# Patient Record
Sex: FEMALE | Race: WHITE | Employment: FULL TIME | URBAN - METROPOLITAN AREA
[De-identification: names, ages, dates, MRNs, and addresses within clinical notes are randomized per-mention and may not be internally consistent; named-entity substitution may affect disease eponyms.]

---

## 2017-01-06 ENCOUNTER — APPOINTMENT (OUTPATIENT)
Dept: LAB | Facility: HOSPITAL | Age: 32
End: 2017-01-06
Attending: OBSTETRICS & GYNECOLOGY
Payer: COMMERCIAL

## 2017-01-06 ENCOUNTER — OFFICE VISIT (OUTPATIENT)
Dept: OBGYN CLINIC | Facility: CLINIC | Age: 32
End: 2017-01-06

## 2017-01-06 VITALS
HEART RATE: 88 BPM | DIASTOLIC BLOOD PRESSURE: 82 MMHG | SYSTOLIC BLOOD PRESSURE: 145 MMHG | WEIGHT: 170 LBS | HEIGHT: 65 IN | BODY MASS INDEX: 28.32 KG/M2

## 2017-01-06 DIAGNOSIS — Z12.4 SCREENING FOR MALIGNANT NEOPLASM OF CERVIX: ICD-10-CM

## 2017-01-06 DIAGNOSIS — N92.0 MENORRHAGIA WITH REGULAR CYCLE: ICD-10-CM

## 2017-01-06 DIAGNOSIS — Z01.419 ENCOUNTER FOR GYNECOLOGICAL EXAMINATION: Primary | ICD-10-CM

## 2017-01-06 LAB — TSH SERPL-ACNC: 2.58 UIU/ML (ref 0.34–5.6)

## 2017-01-06 PROCEDURE — 84443 ASSAY THYROID STIM HORMONE: CPT

## 2017-01-06 PROCEDURE — 36415 COLL VENOUS BLD VENIPUNCTURE: CPT

## 2017-01-06 PROCEDURE — 99395 PREV VISIT EST AGE 18-39: CPT | Performed by: OBSTETRICS & GYNECOLOGY

## 2017-01-06 RX ORDER — NORGESTIMATE AND ETHINYL ESTRADIOL 0.25-0.035
1 KIT ORAL DAILY
Qty: 1 PACKAGE | Refills: 12 | Status: SHIPPED | OUTPATIENT
Start: 2017-01-06 | End: 2017-12-26

## 2017-01-06 RX ORDER — NORGESTIMATE AND ETHINYL ESTRADIOL 7DAYSX3 LO
KIT ORAL
Refills: 4 | COMMUNITY
Start: 2016-12-30 | End: 2018-01-12

## 2017-01-09 NOTE — PROGRESS NOTES
Yumiko Amor is a 36year old female [facility] Patient's last menstrual period was 12/26/2016. here for annual exam.       Last seen 12/21/15. Last pap 12/2013. Currently on Ortho Tri Cyclen Lo but does not like due to heavier periods.   Did not like Mo anxiety. Endocrine:   denies excessive thirst or urination. Heme/Lymph:  easy bruising or bleeding.     PHYSICAL EXAM:   /82 mmHg  Pulse 88  Ht 5' 5\" (1.651 m)  Wt 170 lb (77.111 kg)  BMI 28.29 kg/m2  LMP 12/26/2016  Wt Readings from Last 2 Encount

## 2017-01-26 RX ORDER — NORGESTIMATE AND ETHINYL ESTRADIOL 7DAYSX3 LO
KIT ORAL
Qty: 28 TABLET | Refills: 0 | OUTPATIENT
Start: 2017-01-26

## 2017-01-26 NOTE — TELEPHONE ENCOUNTER
SENT BACK RX DENIED, REQUESTED TOO SOON AND NOTED THAT RX WAS ALREADY SENT TO PHARMACY ON 1-6-17 FOR 1 PACK WITH 12 REFILLS.

## 2017-01-31 ENCOUNTER — LAB SERVICES (OUTPATIENT)
Dept: OTHER | Age: 32
End: 2017-01-31

## 2017-01-31 ENCOUNTER — CHARTING TRANS (OUTPATIENT)
Dept: OTHER | Age: 32
End: 2017-01-31

## 2017-01-31 LAB
APPEARANCE: CLEAR
BILIRUBIN: NORMAL
COLOR: YELLOW
GLUCOSE U: NORMAL
KETONES: NORMAL
LEUKOCYTES: 1
NITRITE: NORMAL
OCCULT BLOOD: NORMAL
OTHER: NORMAL
PH: 6
PROTEIN: NORMAL
URINE SPEC GRAVITY: 1
UROBILINOGEN: 0.2

## 2017-12-26 RX ORDER — NORGESTIMATE AND ETHINYL ESTRADIOL 0.25-0.035
1 KIT ORAL DAILY
Qty: 28 TABLET | Refills: 0 | Status: SHIPPED | OUTPATIENT
Start: 2017-12-26 | End: 2018-01-12

## 2018-01-12 ENCOUNTER — OFFICE VISIT (OUTPATIENT)
Dept: OBGYN CLINIC | Facility: CLINIC | Age: 33
End: 2018-01-12

## 2018-01-12 VITALS
HEART RATE: 76 BPM | WEIGHT: 162 LBS | BODY MASS INDEX: 26.99 KG/M2 | SYSTOLIC BLOOD PRESSURE: 135 MMHG | DIASTOLIC BLOOD PRESSURE: 78 MMHG | HEIGHT: 65 IN

## 2018-01-12 DIAGNOSIS — Z01.419 ENCOUNTER FOR GYNECOLOGICAL EXAMINATION: Primary | ICD-10-CM

## 2018-01-12 PROCEDURE — 99395 PREV VISIT EST AGE 18-39: CPT | Performed by: OBSTETRICS & GYNECOLOGY

## 2018-01-12 RX ORDER — NORGESTIMATE AND ETHINYL ESTRADIOL 0.25-0.035
1 KIT ORAL DAILY
Qty: 28 TABLET | Refills: 12 | Status: SHIPPED | OUTPATIENT
Start: 2018-01-12 | End: 2018-10-05

## 2018-01-12 NOTE — PROGRESS NOTES
Bronwyn Ivey is a 29year old female [facility] Patient's last menstrual period was 12/27/2017. here for annual exam.       Last seen 1/6/17. Last pap 1/2017 normal.    Doing well with [facility] and wants to continue.    and not planning to have ch 135/78   Pulse 76   Ht 5' 5\" (1.651 m)   Wt 162 lb (73.5 kg)   LMP 12/27/2017   BMI 26.96 kg/m²   Wt Readings from Last 2 Encounters:  01/12/18 : 162 lb (73.5 kg)  01/06/17 : 170 lb (77.1 kg)    Body mass index is 26.96 kg/m².     Constitutional: well deve

## 2018-01-14 LAB — HPV I/H RISK 1 DNA SPEC QL NAA+PROBE: NEGATIVE

## 2018-10-03 ENCOUNTER — PATIENT MESSAGE (OUTPATIENT)
Dept: OBGYN CLINIC | Facility: CLINIC | Age: 33
End: 2018-10-03

## 2018-10-04 NOTE — TELEPHONE ENCOUNTER
From: Nora Larson  To: Kofi Johnson MD  Sent: 10/3/2018 8:57 PM CDT  Subject: Prescription Question    Interested in switching to Lo Loestrin Fe birth control.

## 2018-11-05 VITALS
WEIGHT: 155 LBS | TEMPERATURE: 97.9 F | RESPIRATION RATE: 16 BRPM | HEART RATE: 88 BPM | SYSTOLIC BLOOD PRESSURE: 110 MMHG | HEIGHT: 64 IN | BODY MASS INDEX: 26.46 KG/M2 | DIASTOLIC BLOOD PRESSURE: 80 MMHG

## 2019-01-18 ENCOUNTER — OFFICE VISIT (OUTPATIENT)
Dept: OBGYN CLINIC | Facility: CLINIC | Age: 34
End: 2019-01-18
Payer: COMMERCIAL

## 2019-01-18 VITALS
HEIGHT: 65 IN | HEART RATE: 97 BPM | BODY MASS INDEX: 28.22 KG/M2 | WEIGHT: 169.38 LBS | SYSTOLIC BLOOD PRESSURE: 134 MMHG | DIASTOLIC BLOOD PRESSURE: 87 MMHG

## 2019-01-18 DIAGNOSIS — Z01.419 ENCOUNTER FOR GYNECOLOGICAL EXAMINATION: Primary | ICD-10-CM

## 2019-01-18 PROCEDURE — 99395 PREV VISIT EST AGE 18-39: CPT | Performed by: OBSTETRICS & GYNECOLOGY

## 2019-01-18 RX ORDER — NORETHINDRONE ACETATE AND ETHINYL ESTRADIOL, ETHINYL ESTRADIOL AND FERROUS FUMARATE 1MG-10(24)
KIT ORAL
Qty: 1 PACKAGE | Refills: 12 | Status: SHIPPED | OUTPATIENT
Start: 2019-01-18 | End: 2021-08-19 | Stop reason: ALTCHOICE

## 2019-01-18 RX ORDER — NORETHINDRONE ACETATE AND ETHINYL ESTRADIOL, ETHINYL ESTRADIOL AND FERROUS FUMARATE 1MG-10(24)
KIT ORAL
Refills: 2 | COMMUNITY
Start: 2018-12-18 | End: 2019-01-18

## 2019-01-19 RX ORDER — NORETHINDRONE ACETATE AND ETHINYL ESTRADIOL, ETHINYL ESTRADIOL AND FERROUS FUMARATE 1MG-10(24)
KIT ORAL
Qty: 28 TABLET | Refills: 0 | OUTPATIENT
Start: 2019-01-19

## 2019-01-19 NOTE — TELEPHONE ENCOUNTER
Pt was seen by Kevin Alexandra on 1/18 and was given an rx for lo loestrin fe #1 with 12 refills. Refills denied.

## 2019-01-19 NOTE — PROGRESS NOTES
Ryan Naqvi is a 34year old female [facility] Patient's last menstrual period was 01/01/2019. here for annual exam.       Last seen 1/12/18. Last pap 1/2018 normal with neg HPV. Changed to Meghan PATEL in 10/2018.   States she is much happier with thirst or urination. Heme/Lymph:  easy bruising or bleeding.     PHYSICAL EXAM:   /87   Pulse 97   Ht 5' 5\" (1.651 m)   Wt 169 lb 6.4 oz (76.8 kg)   LMP 01/01/2019   BMI 28.19 kg/m²   Wt Readings from Last 2 Encounters:  01/18/19 : 169 lb 6.4 oz (76

## 2019-06-20 NOTE — PATIENT INSTRUCTIONS
Jany Bhatia is a 50year old female. Patient presents with:  Back Pain: Patient is here today with lower right sided back pain for the past 3 weeks. She injured it while exercising-- a certain movement caused the issues.  Pain is not constant-- occurs wit Pap and HPV.   Refill Mononessa Interested in starting something other than Effexor. Doing yoga which helps.          Current Outpatient Medications:  Albuterol Sulfate HFA (VENTOLIN HFA) 108 (90 Base) MCG/ACT Inhalation Aero Soln Inhale 2 puffs into the lungs every 4 (four) hours as need Encounters:  06/20/19 : 207 lb (93.9 kg)  04/30/19 : 208 lb (94.3 kg)  04/05/19 : 208 lb (94.3 kg)  03/19/19 : 208 lb (94.3 kg)  09/12/18 : 203 lb 9.6 oz (92.4 kg)    Body mass index is 33.41 kg/m².       EXAM:   /70 (BP Location: Right arm, Patient P

## 2021-08-19 ENCOUNTER — OFFICE VISIT (OUTPATIENT)
Dept: FAMILY MEDICINE CLINIC | Facility: CLINIC | Age: 36
End: 2021-08-19
Payer: COMMERCIAL

## 2021-08-19 VITALS
HEART RATE: 96 BPM | HEIGHT: 65 IN | WEIGHT: 150 LBS | BODY MASS INDEX: 24.99 KG/M2 | DIASTOLIC BLOOD PRESSURE: 76 MMHG | SYSTOLIC BLOOD PRESSURE: 122 MMHG

## 2021-08-19 DIAGNOSIS — Z00.00 ROUTINE GENERAL MEDICAL EXAMINATION AT A HEALTH CARE FACILITY: Primary | ICD-10-CM

## 2021-08-19 DIAGNOSIS — E55.9 VITAMIN D DEFICIENCY: ICD-10-CM

## 2021-08-19 PROCEDURE — 3074F SYST BP LT 130 MM HG: CPT | Performed by: PHYSICIAN ASSISTANT

## 2021-08-19 PROCEDURE — 3008F BODY MASS INDEX DOCD: CPT | Performed by: PHYSICIAN ASSISTANT

## 2021-08-19 PROCEDURE — 3078F DIAST BP <80 MM HG: CPT | Performed by: PHYSICIAN ASSISTANT

## 2021-08-19 PROCEDURE — 99395 PREV VISIT EST AGE 18-39: CPT | Performed by: PHYSICIAN ASSISTANT

## 2021-08-19 NOTE — PROGRESS NOTES
HPI:   Yaquelin Griffin is a 31year old female who presents for an Annual Health Visit. Patient is doing fine at this time. Patient denies of chest pain, SOB, N/V/C/D, fever, dizziness, syncope, abdominal pain. There are no other concerns today. ear canal and external ear normal. There is no impacted cerumen. Left Ear: Tympanic membrane, ear canal and external ear normal. There is no impacted cerumen.       Nose: Nose normal.      Mouth/Throat:      Mouth: Mucous membranes are moist.      Phar 18 to 39  Screening tests and vaccines are an important part of managing your health. A screening test is done to find possible disorders or diseases in people who don't have any symptoms.  The goal is to find a disease early so lifestyle changes can be mad exams   HIV All women At routine exams3     Obesity All women in this age group  At routine exams   Syphilis Women at increased risk for infection should talk with their healthcare provider  At routine exams   Tuberculosis Women at increased risk for infec pneumococcal bacteria)   PPSV23: 1 to 2 doses through age 59, or 1 dose at 72 or older (protects against 23 types of pneumococcal bacteria)    Tetanus/diphtheria/pertussis (Td/Tdap) booster All women in this age group  Td every 10 years, or a one-time dose for professional medical care. Always follow your healthcare professional's instructions. The patient indicates understanding of these issues and agrees to the plan. Problem List:  There is no problem list on file for this patient.       Minhx

## 2024-05-18 ENCOUNTER — LAB ENCOUNTER (OUTPATIENT)
Dept: LAB | Age: 39
End: 2024-05-18
Attending: PHYSICIAN ASSISTANT

## 2024-05-18 ENCOUNTER — OFFICE VISIT (OUTPATIENT)
Dept: FAMILY MEDICINE CLINIC | Facility: CLINIC | Age: 39
End: 2024-05-18

## 2024-05-18 VITALS
SYSTOLIC BLOOD PRESSURE: 129 MMHG | DIASTOLIC BLOOD PRESSURE: 86 MMHG | WEIGHT: 143 LBS | HEART RATE: 66 BPM | BODY MASS INDEX: 23.82 KG/M2 | HEIGHT: 65 IN

## 2024-05-18 DIAGNOSIS — Z00.00 ROUTINE GENERAL MEDICAL EXAMINATION AT A HEALTH CARE FACILITY: ICD-10-CM

## 2024-05-18 DIAGNOSIS — Z00.00 ROUTINE GENERAL MEDICAL EXAMINATION AT A HEALTH CARE FACILITY: Primary | ICD-10-CM

## 2024-05-18 DIAGNOSIS — Z01.419 ENCOUNTER FOR ROUTINE GYNECOLOGICAL EXAMINATION WITH PAPANICOLAOU SMEAR OF CERVIX: ICD-10-CM

## 2024-05-18 DIAGNOSIS — E55.9 VITAMIN D DEFICIENCY: ICD-10-CM

## 2024-05-18 LAB
ALBUMIN SERPL-MCNC: 4.6 G/DL (ref 3.2–4.8)
ALBUMIN/GLOB SERPL: 1.7 {RATIO} (ref 1–2)
ALP LIVER SERPL-CCNC: 39 U/L
ALT SERPL-CCNC: 21 U/L
ANION GAP SERPL CALC-SCNC: 7 MMOL/L (ref 0–18)
AST SERPL-CCNC: 18 U/L (ref ?–34)
BASOPHILS # BLD AUTO: 0.04 X10(3) UL (ref 0–0.2)
BASOPHILS NFR BLD AUTO: 1 %
BILIRUB SERPL-MCNC: 0.5 MG/DL (ref 0.3–1.2)
BUN BLD-MCNC: 23 MG/DL (ref 9–23)
BUN/CREAT SERPL: 25.3 (ref 10–20)
CALCIUM BLD-MCNC: 9.7 MG/DL (ref 8.7–10.4)
CHLORIDE SERPL-SCNC: 111 MMOL/L (ref 98–112)
CHOLEST SERPL-MCNC: 217 MG/DL (ref ?–200)
CO2 SERPL-SCNC: 27 MMOL/L (ref 21–32)
CREAT BLD-MCNC: 0.91 MG/DL
DEPRECATED RDW RBC AUTO: 38.9 FL (ref 35.1–46.3)
EGFRCR SERPLBLD CKD-EPI 2021: 84 ML/MIN/1.73M2 (ref 60–?)
EOSINOPHIL # BLD AUTO: 0.09 X10(3) UL (ref 0–0.7)
EOSINOPHIL NFR BLD AUTO: 2.3 %
ERYTHROCYTE [DISTWIDTH] IN BLOOD BY AUTOMATED COUNT: 12.6 % (ref 11–15)
FASTING PATIENT LIPID ANSWER: YES
FASTING STATUS PATIENT QL REPORTED: YES
GLOBULIN PLAS-MCNC: 2.7 G/DL (ref 2–3.5)
GLUCOSE BLD-MCNC: 89 MG/DL (ref 70–99)
HCT VFR BLD AUTO: 39.5 %
HDLC SERPL-MCNC: 90 MG/DL (ref 40–59)
HGB BLD-MCNC: 13.6 G/DL
IMM GRANULOCYTES # BLD AUTO: 0.01 X10(3) UL (ref 0–1)
IMM GRANULOCYTES NFR BLD: 0.3 %
LDLC SERPL CALC-MCNC: 120 MG/DL (ref ?–100)
LYMPHOCYTES # BLD AUTO: 1.31 X10(3) UL (ref 1–4)
LYMPHOCYTES NFR BLD AUTO: 33.6 %
MCH RBC QN AUTO: 29.3 PG (ref 26–34)
MCHC RBC AUTO-ENTMCNC: 34.4 G/DL (ref 31–37)
MCV RBC AUTO: 85.1 FL
MONOCYTES # BLD AUTO: 0.29 X10(3) UL (ref 0.1–1)
MONOCYTES NFR BLD AUTO: 7.4 %
NEUTROPHILS # BLD AUTO: 2.16 X10 (3) UL (ref 1.5–7.7)
NEUTROPHILS # BLD AUTO: 2.16 X10(3) UL (ref 1.5–7.7)
NEUTROPHILS NFR BLD AUTO: 55.4 %
NONHDLC SERPL-MCNC: 127 MG/DL (ref ?–130)
OSMOLALITY SERPL CALC.SUM OF ELEC: 303 MOSM/KG (ref 275–295)
PLATELET # BLD AUTO: 249 10(3)UL (ref 150–450)
POTASSIUM SERPL-SCNC: 4.6 MMOL/L (ref 3.5–5.1)
PROT SERPL-MCNC: 7.3 G/DL (ref 5.7–8.2)
RBC # BLD AUTO: 4.64 X10(6)UL
SODIUM SERPL-SCNC: 145 MMOL/L (ref 136–145)
TRIGL SERPL-MCNC: 41 MG/DL (ref 30–149)
TSI SER-ACNC: 0.9 MIU/ML (ref 0.55–4.78)
VIT D+METAB SERPL-MCNC: 17.2 NG/ML (ref 30–100)
VLDLC SERPL CALC-MCNC: 7 MG/DL (ref 0–30)
WBC # BLD AUTO: 3.9 X10(3) UL (ref 4–11)

## 2024-05-18 PROCEDURE — 80061 LIPID PANEL: CPT | Performed by: PHYSICIAN ASSISTANT

## 2024-05-18 PROCEDURE — 84443 ASSAY THYROID STIM HORMONE: CPT | Performed by: PHYSICIAN ASSISTANT

## 2024-05-18 PROCEDURE — 85025 COMPLETE CBC W/AUTO DIFF WBC: CPT | Performed by: PHYSICIAN ASSISTANT

## 2024-05-18 PROCEDURE — 82306 VITAMIN D 25 HYDROXY: CPT

## 2024-05-18 PROCEDURE — 36415 COLL VENOUS BLD VENIPUNCTURE: CPT

## 2024-05-18 PROCEDURE — 80053 COMPREHEN METABOLIC PANEL: CPT | Performed by: PHYSICIAN ASSISTANT

## 2024-05-18 PROCEDURE — 99395 PREV VISIT EST AGE 18-39: CPT | Performed by: PHYSICIAN ASSISTANT

## 2024-05-18 NOTE — PROGRESS NOTES
HPI:   Anna GOINS is a 38 year old female who presents for an Annual Health Visit.   The patient is doing fine at this time. The patient denies chest pain, SOB, N/V/C/D, fever, dizziness, syncope, and abdominal pain. There are no other concerns today.      Allergies:   No Known Allergies    CURRENT MEDICATIONS   No current outpatient medications on file.      HISTORICAL INFORMATION   Past Medical History:    Decorative tattoo      History reviewed. No pertinent surgical history.   Family History   Problem Relation Age of Onset    Hypertension Father     Cancer Father         bladder    Heart Attack Maternal Grandfather         myocardial infarction      SOCIAL HISTORY   Social History     Socioeconomic History    Marital status:    Tobacco Use    Smoking status: Never    Smokeless tobacco: Never   Vaping Use    Vaping status: Never Used   Substance and Sexual Activity    Alcohol use: Yes     Alcohol/week: 0.0 standard drinks of alcohol     Comment: occasionally    Drug use: No    Sexual activity: Yes     Partners: Male     Birth control/protection: OCP   Other Topics Concern    Caffeine Concern Yes     Comment: coffee, 1 cup daily     Social History     Social History Narrative    Not on file        REVIEW OF SYSTEMS:     Review of Systems   Constitutional: Negative.    HENT: Negative.     Eyes: Negative.    Respiratory: Negative.     Cardiovascular: Negative.    Gastrointestinal: Negative.    Genitourinary: Negative.    Musculoskeletal: Negative.    Skin: Negative.    Neurological: Negative.    Psychiatric/Behavioral: Negative.           EXAM:   /86   Pulse 66   Ht 5' 5\" (1.651 m)   Wt 143 lb (64.9 kg)   LMP 05/10/2024 (Approximate)   BMI 23.80 kg/m²    Wt Readings from Last 6 Encounters:   05/18/24 143 lb (64.9 kg)   08/19/21 150 lb (68 kg)   01/18/19 169 lb 6.4 oz (76.8 kg)   01/12/18 162 lb (73.5 kg)   01/06/17 170 lb (77.1 kg)   12/21/15 165 lb (74.8 kg)     Body mass index is 23.8  kg/m².    Physical Exam  Vitals reviewed.   Constitutional:       Appearance: Normal appearance. She is well-developed and normal weight.   HENT:      Head: Normocephalic and atraumatic.      Right Ear: Tympanic membrane, ear canal and external ear normal. There is no impacted cerumen.      Left Ear: Tympanic membrane, ear canal and external ear normal. There is no impacted cerumen.      Nose: Nose normal.      Mouth/Throat:      Mouth: Mucous membranes are moist.      Pharynx: Oropharynx is clear. No oropharyngeal exudate or posterior oropharyngeal erythema.   Eyes:      General:         Right eye: No discharge.         Left eye: No discharge.      Conjunctiva/sclera: Conjunctivae normal.   Cardiovascular:      Rate and Rhythm: Normal rate and regular rhythm.      Heart sounds: Normal heart sounds.   Pulmonary:      Effort: Pulmonary effort is normal.      Breath sounds: Normal breath sounds.   Abdominal:      General: Abdomen is flat. Bowel sounds are normal. There is no distension.      Palpations: Abdomen is soft.      Tenderness: There is no abdominal tenderness. There is no right CVA tenderness or left CVA tenderness.   Genitourinary:     Vagina: Normal.   Musculoskeletal:         General: Normal range of motion.      Cervical back: Normal range of motion and neck supple.   Skin:     Findings: No rash.   Neurological:      Mental Status: She is alert and oriented to person, place, and time.   Psychiatric:         Mood and Affect: Mood normal.         Behavior: Behavior normal.         Thought Content: Thought content normal.         Judgment: Judgment normal.        ASSESSMENT AND PLAN:   Anna was seen today for routine physical.    Diagnoses and all orders for this visit:    Routine general medical examination at a health care facility  -     CBC With Differential With Platelet  -     Comp Metabolic Panel (14)  -     Lipid Panel  -     TSH W Reflex To Free T4  -     Vitamin D; Future    Vitamin D  deficiency  -     Vitamin D; Future    Encounter for routine gynecological examination with Papanicolaou smear of cervix  -     ThinPrep PAP with HPV Reflex Request B; Future    Overall health discussed, exercise/activity appropriate for age and health status, heathy diety, preventive care, and upcoming screening discussed. Routine labs ordered.    There are no Patient Instructions on file for this visit.    The patient indicates understanding of these issues and agrees to the plan.    Problem List:  There is no problem list on file for this patient.      Prashant Brantley PA-C  5/18/2024  9:40 AM

## 2024-05-23 LAB
.: NORMAL
.: NORMAL
HPV I/H RISK 1 DNA SPEC QL NAA+PROBE: NEGATIVE

## 2024-06-25 ENCOUNTER — PATIENT MESSAGE (OUTPATIENT)
Dept: FAMILY MEDICINE CLINIC | Facility: CLINIC | Age: 39
End: 2024-06-25

## 2024-06-29 NOTE — TELEPHONE ENCOUNTER
Spoke with patient ( & Name verified) at 16:00 pm.  Advise the patient that will order Blood type next blood work up. Advise the patient not to fast 36-40 hours. Patient verbalized understanding.

## 2024-08-01 ENCOUNTER — OFFICE VISIT (OUTPATIENT)
Dept: INTERNAL MEDICINE CLINIC | Facility: CLINIC | Age: 39
End: 2024-08-01
Payer: COMMERCIAL

## 2024-08-01 VITALS
BODY MASS INDEX: 24.32 KG/M2 | SYSTOLIC BLOOD PRESSURE: 134 MMHG | WEIGHT: 146 LBS | HEIGHT: 65 IN | HEART RATE: 83 BPM | DIASTOLIC BLOOD PRESSURE: 71 MMHG

## 2024-08-01 PROCEDURE — 99203 OFFICE O/P NEW LOW 30 MIN: CPT | Performed by: NURSE PRACTITIONER

## 2024-08-01 NOTE — PROGRESS NOTES
Anna GOINS is a 39 year old female.  HPI:   Pt is new to me. She reports weight loss (intentional) in the past year, was 150 lbs and lost weight, went down to 132,but now at 146. She reports she was fasting twice a week, working out with cardio 3-4x/week approx 60-90 min and HIIT strength training workouts 10-20 minutes. Reports she is having difficulty with keeping the weight off/regaining. Drinks  ounces of water daily, no soda, will have black coffee, egg white/lean protein with greens/veggies, reports she does not count macros.   Current Outpatient Medications   Medication Sig Dispense Refill    ergocalciferol 1.25 MG (68422 UT) Oral Cap Take 1 capsule (50,000 Units total) by mouth every 7 days. 12 capsule 3      Past Medical History:    Decorative tattoo      Social History:  Social History     Socioeconomic History    Marital status:    Tobacco Use    Smoking status: Never    Smokeless tobacco: Never   Vaping Use    Vaping status: Never Used   Substance and Sexual Activity    Alcohol use: Yes     Alcohol/week: 0.0 standard drinks of alcohol     Comment: occasionally    Drug use: No    Sexual activity: Yes     Partners: Male     Birth control/protection: OCP   Other Topics Concern    Caffeine Concern Yes     Comment: coffee, 1 cup daily        REVIEW OF SYSTEMS:   Review of Systems   Constitutional:  Negative for activity change, appetite change, fatigue, fever and unexpected weight change.   HENT:  Negative for congestion, dental problem and sore throat.    Eyes:  Negative for visual disturbance.   Respiratory:  Negative for cough, chest tightness, shortness of breath and wheezing.    Cardiovascular:  Negative for chest pain, palpitations and leg swelling.   Gastrointestinal:  Negative for abdominal pain, constipation, diarrhea, nausea and vomiting.   Endocrine: Negative.    Genitourinary:  Negative for difficulty urinating, frequency and menstrual problem.   Musculoskeletal:  Negative for  arthralgias and back pain.   Skin:  Negative for color change, pallor, rash and wound.   Neurological:  Negative for dizziness, seizures, light-headedness, numbness and headaches.   Psychiatric/Behavioral:  Negative for behavioral problems, dysphoric mood and suicidal ideas. The patient is not nervous/anxious.           EXAM:   /71 (BP Location: Left arm, Patient Position: Sitting, Cuff Size: adult)   Pulse 83   Ht 5' 5\" (1.651 m)   Wt 146 lb (66.2 kg)   LMP 07/28/2024 (Approximate)   BMI 24.30 kg/m²     Physical Exam  Vitals reviewed.   Constitutional:       General: She is in acute distress.   Eyes:      General: No scleral icterus.     Conjunctiva/sclera: Conjunctivae normal.   Musculoskeletal:         General: No swelling.   Skin:     General: Skin is warm.      Coloration: Skin is not jaundiced.   Neurological:      General: No focal deficit present.      Mental Status: She is alert and oriented to person, place, and time.   Psychiatric:         Mood and Affect: Mood normal.         Judgment: Judgment normal.            ASSESSMENT AND PLAN:   1. BMI 24.0-24.9, adult  -Discussed patient's weight loss goals. Referral to dietician. Advised to use macro counter for daily food intake. Consider reducing weight training and try resistance bands instead. Check weight daily.   - DIETITIAN EDUCATION INITIAL, DIET (INTERNAL)       The patient indicates understanding of these issues and agrees to the plan.  The patient is asked to return in 4-6 weeks.     The above note was creating using Dragon speech recognition technology. Please excuse any typos.

## 2024-08-13 ENCOUNTER — ORDER TRANSCRIPTION (OUTPATIENT)
Dept: ADMINISTRATIVE | Facility: HOSPITAL | Age: 39
End: 2024-08-13

## 2024-09-30 ENCOUNTER — HOSPITAL ENCOUNTER (OUTPATIENT)
Dept: NUTRITION | Facility: HOSPITAL | Age: 39
End: 2024-09-30
Attending: NURSE PRACTITIONER
Payer: COMMERCIAL

## 2024-09-30 PROCEDURE — 97802 MEDICAL NUTRITION INDIV IN: CPT | Performed by: DIETITIAN, REGISTERED

## 2024-09-30 NOTE — PROGRESS NOTES
Nutrition Assessment    Anna GOINS is a 39 year old female.    Referred by: Attending  Referring Physician Name: Lanette Guevara    Assessment     Medical Nutrition Therapy Comment: BMI 24-24.9  Visit Information: Initial Visit 9/30/24  60 minute spent with patient    ANTHROPOMETRICS  HT:5' 5\"  WT: 146 lb (9/30/24), 150 lb (3/2024)  Weight Loss: Yes, Lost   Usual Body Wt: 68 kg/150 lbs  103% UBW  IBW: 57 kg/125 lbs  117% IBW  BMI: 24.3  BMI CLASSIFICATION: 19-24.9 kg/m2 - WNL      SIGNIFICANT MEDICAL Hx  Significant Past Medical Hx: Elevated cholesterol  Pertinent Medications: No current outpatient medications on file.  Pertinent Lab Results: 5/18/24: Cholesterol 217 (high),  (high), HDL 90 , TG 41    DIET HISTORY  Number of meals per day: 3  Number of snacks per day: 1  Number of meals away from home (per week):  occasionally eats out  Comment: Anna met with the dietitian to discuss a meal plan to help with weight loss. She has been working out 5 days per week, cardio and strength training, for several years. She has a history of being 200 pound in the past and works hard to make sure she does not regain weight. Since March 2024, her workouts and food routine have been more rigid. She started fasting for 40 hours, 2 days per week. Looking at food log, she has days as low as 850 calories and some days up to 1600 calories. After fasting, she is very hungry and will eat more and than restrict the following day. Her activity is high with HIIT workouts 4 days per week and strength 2 days per week and her intake is often below estimated energy needs. Energy is also low. Food logs show carbohydrates are below 20 grams daily, while fat and protein offer the majority of her calories. No regular fruit intake, vegetables are about 1/2-1 cup per day. Dietary fiber is less than 10 grams per day. Reviewed a 100 grams carbohydrate, 100 gram protein to aid in gradual weight loss and improvement in energy.meal plan      PHYSICAL ACTIVITY  Type: other: HIIT, strength, yoga  Duration: 60 minutes  Frequency: per day  Physical Assessment: Meets goal for weight loss    Nutrition Diagnosis:  Elevated nutrition related lab value due to excess intake fo fat as evidenced by elevated LDL of 129 mg/dL    Intervention     Nutrition Education: Recommended Modification  Nutrition/Diet Handouts Given: Weight Loss Diet Handouts:  Weight Management Packet Includes: Calorie/Carb Counting, Portion Size Control, Healthy Snacks, Healthy Salads, and Food Journal      NUTRITION PRESCRIPTION:      Needs:  1300 Calorie     100 gm Protein      Oral Diet Prescription: 100 grams carbohydrate, 100 grams protein, 58 grams fat meal plan.      Goals     Nutrition Goals:   Increase fruit and vegetables to 5 servings per day to add 15 grams of dietary fiber. Goal fiber intake is 25-30 grams per day.  Add Psyllium fiber supplement daily.  Gradually increase carbohydrates to 100 grams per day. See if energy level improves.  Continue to be active daily.  Continue  ounces of water per day.      Recommendation to MD: none at this time    Assessment of Ability/Barriers     Patient and/or Family Will: Verbalize Understanding    Patient and/or Family Ability to Learn: Retain Information    Readiness to Learn: Motivated    Barriers to Learning: None    9/30/2024  Falguni Bailey RD

## 2025-04-03 ENCOUNTER — OFFICE VISIT (OUTPATIENT)
Dept: FAMILY MEDICINE CLINIC | Facility: CLINIC | Age: 40
End: 2025-04-03
Payer: COMMERCIAL

## 2025-04-03 VITALS
HEART RATE: 69 BPM | DIASTOLIC BLOOD PRESSURE: 73 MMHG | HEIGHT: 65 IN | WEIGHT: 153 LBS | SYSTOLIC BLOOD PRESSURE: 114 MMHG | BODY MASS INDEX: 25.49 KG/M2

## 2025-04-03 DIAGNOSIS — Z00.00 ROUTINE MEDICAL EXAM: ICD-10-CM

## 2025-04-03 DIAGNOSIS — E55.9 VITAMIN D DEFICIENCY: ICD-10-CM

## 2025-04-03 DIAGNOSIS — L70.0 ACNE VULGARIS: Primary | ICD-10-CM

## 2025-04-03 DIAGNOSIS — Z76.89 ENCOUNTER FOR WEIGHT MANAGEMENT: ICD-10-CM

## 2025-04-03 PROCEDURE — 99214 OFFICE O/P EST MOD 30 MIN: CPT | Performed by: FAMILY MEDICINE

## 2025-04-03 RX ORDER — CLINDAMYCIN PHOSPHATE 10 MG/G
GEL TOPICAL
Qty: 60 G | Refills: 2 | Status: SHIPPED | OUTPATIENT
Start: 2025-04-03

## 2025-04-03 RX ORDER — DOXYCYCLINE 100 MG/1
100 CAPSULE ORAL 2 TIMES DAILY
Qty: 14 CAPSULE | Refills: 1 | Status: SHIPPED | OUTPATIENT
Start: 2025-04-03

## 2025-04-03 RX ORDER — TRETINOIN 0.25 MG/G
CREAM TOPICAL
Qty: 45 G | Refills: 2 | Status: SHIPPED | OUTPATIENT
Start: 2025-04-03

## 2025-04-03 NOTE — PROGRESS NOTES
HPI:   Anna GOINS is a 39 year old female who presents for:     Patient presents with:  Acne: Started last year around the summer after returning from [facility], , mostly Rt lat chin, some Lt side but much less, has tried OTC Neutrogena wash and benzoyl peroxide (too drying) w/o improvement, no other changes in diet, exposures or activity  Patient sleeps on her RT side/face            See ROS below for other pertinent positive and negative complaints.    I reviewed her's Past Medical History, Past Surgical History, Family and Social History    Labs:   Lab Results   Component Value Date/Time    WBC 3.9 (L) 05/18/2024 10:23 AM    HGB 13.6 05/18/2024 10:23 AM    .0 05/18/2024 10:23 AM      Lab Results   Component Value Date/Time    GLU 89 05/18/2024 10:23 AM     05/18/2024 10:23 AM    K 4.6 05/18/2024 10:23 AM     05/18/2024 10:23 AM    CO2 27.0 05/18/2024 10:23 AM    CREATSERUM 0.91 05/18/2024 10:23 AM    CA 9.7 05/18/2024 10:23 AM    ALB 4.6 05/18/2024 10:23 AM    TP 7.3 05/18/2024 10:23 AM    ALKPHO 39 05/18/2024 10:23 AM    AST 18 05/18/2024 10:23 AM    ALT 21 05/18/2024 10:23 AM    BILT 0.5 05/18/2024 10:23 AM    TSH 0.897 05/18/2024 10:23 AM        Lab Results   Component Value Date/Time    CHOLEST 217 (H) 05/18/2024 10:23 AM    HDL 90 (H) 05/18/2024 10:23 AM    TRIG 41 05/18/2024 10:23 AM     (H) 05/18/2024 10:23 AM    NONHDLC 127 05/18/2024 10:23 AM           Current Outpatient Medications   Medication Sig Dispense Refill    doxycycline 100 MG Oral Cap Take 1 capsule (100 mg total) by mouth 2 (two) times daily. 14 capsule 1    Clindamycin Phosphate 1 % External Gel Apply to affected areas once to twice a day 60 g 2    tretinoin 0.025 % External Cream Apply to AA of face qhs, if too drying decrease to every other night 45 g 2      Past Medical History:    Decorative tattoo      History reviewed. No pertinent surgical history.   Family History   Problem Relation Age of Onset     Hypertension Father     Cancer Father         bladder    Heart Attack Maternal Grandfather         myocardial infarction     Allergies[1]   Social History:  Social History     Socioeconomic History    Marital status:    Tobacco Use    Smoking status: Never    Smokeless tobacco: Never   Vaping Use    Vaping status: Never Used   Substance and Sexual Activity    Alcohol use: Yes     Alcohol/week: 0.0 standard drinks of alcohol     Comment: occasionally    Drug use: No    Sexual activity: Yes     Partners: Male     Birth control/protection: OCP   Other Topics Concern    Caffeine Concern Yes     Comment: coffee, 1 cup daily         REVIEW OF SYSTEMS:   Review of Systems   Constitutional: Negative.    HENT: Negative.     Eyes: Negative.    Respiratory: Negative.     Cardiovascular: Negative.    Gastrointestinal: Negative.    Genitourinary: Negative.    Musculoskeletal: Negative.    Skin:  Positive for rash.   Neurological: Negative.    Psychiatric/Behavioral: Negative.         EXAM:   /73   Pulse 69   Ht 5' 5\" (1.651 m)   Wt 153 lb (69.4 kg)   LMP 03/17/2025 (Exact Date)   BMI 25.46 kg/m²  Body mass index is 25.46 kg/m².  Physical Exam  Vitals and nursing note reviewed.   Constitutional:       General: She is not in acute distress.     Appearance: Normal appearance. She is normal weight. She is not ill-appearing, toxic-appearing or diaphoretic.   HENT:      Head: Normocephalic.   Eyes:      General:         Right eye: No discharge.         Left eye: No discharge.      Extraocular Movements: Extraocular movements intact.      Conjunctiva/sclera: Conjunctivae normal.   Cardiovascular:      Rate and Rhythm: Normal rate and regular rhythm.      Pulses: Normal pulses.      Heart sounds: No murmur heard.  Pulmonary:      Effort: Pulmonary effort is normal.      Breath sounds: Normal breath sounds.   Abdominal:      General: Abdomen is flat. Bowel sounds are normal. There is no distension.      Palpations:  Abdomen is soft. There is no mass.      Tenderness: There is no abdominal tenderness. There is no guarding or rebound.      Hernia: No hernia is present.   Musculoskeletal:         General: No swelling, tenderness or deformity.      Cervical back: Normal range of motion and neck supple. No rigidity or tenderness.      Right lower leg: No edema.      Left lower leg: No edema.   Lymphadenopathy:      Cervical: No cervical adenopathy.   Skin:     General: Skin is warm and dry.      Capillary Refill: Capillary refill takes less than 2 seconds.      Comments: Face: RT lower face/chin: Few small cystic acne, few scattered comedonal and pustular acne with mild scarring, left face/chin-clear of acne few shallow small scars   Neurological:      General: No focal deficit present.      Mental Status: She is alert and oriented to person, place, and time.      Cranial Nerves: No cranial nerve deficit.      Gait: Gait normal.      Deep Tendon Reflexes: Reflexes normal.   Psychiatric:         Mood and Affect: Mood normal.         Behavior: Behavior normal.         Thought Content: Thought content normal.         Judgment: Judgment normal.         ASSESSMENT AND PLAN:   1. Anna GOINS is a 39 year old female who presents for:  New onset last summer, etiology unclear    1. Acne vulgaris  Doxycycline twice daily for 7 days to treat possible localized infection/flare  Clindamycin and Retin-A (as tolerated) for minimum 4 months then continue if still needed  Consider follow-up evaluation as below if no resolution or improvement in symptoms  See additional recommendations and follow-up below  - doxycycline 100 MG Oral Cap; Take 1 capsule (100 mg total) by mouth 2 (two) times daily.  Dispense: 14 capsule; Refill: 1  - Clindamycin Phosphate 1 % External Gel; Apply to affected areas once to twice a day  Dispense: 60 g; Refill: 2  - tretinoin 0.025 % External Cream; Apply to AA of face qhs, if too drying decrease to every other night   Dispense: 45 g; Refill: 2  - LH (Luteinizing Hormone)  - FSH  - Testosterone,Total and Weakly Bound w/ SHBG  - TSH W Reflex To Free T4  - Dehydroepiandrosterone Sulfate  - Progesterone  - Estradiol    2. Encounter for weight management  Refer to bariatrics  - BARIATRICS - INTERNAL    3. Routine medical exam  Follow-up pending test results and for complete physical exam  - CBC With Differential With Platelet  - Comp Metabolic Panel (14)    4. Vitamin D deficiency  Recheck now, follow-up pending results  - Vitamin D; Future    Patient Instructions   Will contact you/patient when the test(s): lab, result(s) are final and with further recommendations then if any changes.     Recommend:    Medication(s), , as prescribed.    Follow-up in the next 2-3 days as needed, or sooner if no improvement or symptoms are worsening.      Follow-up with me or your PCP in  , or sooner as needed.    Follow-up with the  as soon as an appointment is available      Go to the emergency room or call 911 for any new or suddenly worsening symptoms, any signs of acute distress, respiratory distress or emergent changes.      Follow up: as above (See AVS)    All questions were answered.  We discussed the indications, proper use, risks, and benefits of the above recommendations including any medication(s) as prescribed.  The patient (and/or guardian or parent if less than 18 years old) indicate(s) understanding and agree(s) to the above plan of care.    Orders Placed This Encounter   Procedures    LH (Luteinizing Hormone)    FSH    Testosterone,Total and Weakly Bound w/ SHBG    TSH W Reflex To Free T4    CBC With Differential With Platelet    Dehydroepiandrosterone Sulfate    Progesterone    Estradiol    Comp Metabolic Panel (14)    Vitamin D     Meds & Refills for this Visit:  Requested Prescriptions     Signed Prescriptions Disp Refills    doxycycline 100 MG Oral Cap 14 capsule 1     Sig: Take 1 capsule (100 mg total) by mouth 2 (two) times  daily.    Clindamycin Phosphate 1 % External Gel 60 g 2     Sig: Apply to affected areas once to twice a day    tretinoin 0.025 % External Cream 45 g 2     Sig: Apply to AA of face qhs, if too drying decrease to every other night     Other Orders, Imaging & Consults:  BARIATRICS - INTERNAL    Bonny Horn MD       [1] No Known Allergies

## 2025-04-03 NOTE — PATIENT INSTRUCTIONS
Will contact you/patient when the test(s): lab, result(s) are final and with further recommendations then if any changes.     Recommend:    Medication(s), , as prescribed.    Follow-up in the next 2-3 days as needed, or sooner if no improvement or symptoms are worsening.      Follow-up with me or your PCP in  , or sooner as needed.    Follow-up with the  as soon as an appointment is available      Go to the emergency room or call 911 for any new or suddenly worsening symptoms, any signs of acute distress, respiratory distress or emergent changes.

## 2025-08-22 ENCOUNTER — HOSPITAL ENCOUNTER (OUTPATIENT)
Age: 40
Discharge: HOME | End: 2025-08-22

## 2025-08-22 VITALS
DIASTOLIC BLOOD PRESSURE: 73 MMHG | RESPIRATION RATE: 18 BRPM | SYSTOLIC BLOOD PRESSURE: 121 MMHG | HEART RATE: 94 BPM | TEMPERATURE: 98 F | OXYGEN SATURATION: 97 %

## 2025-08-22 DIAGNOSIS — R05.1 ACUTE COUGH: ICD-10-CM

## 2025-08-22 DIAGNOSIS — J01.90 ACUTE NON-RECURRENT SINUSITIS, UNSPECIFIED LOCATION: Primary | ICD-10-CM

## 2025-08-22 PROCEDURE — 99213 OFFICE O/P EST LOW 20 MIN: CPT

## 2025-08-22 PROCEDURE — 99204 OFFICE O/P NEW MOD 45 MIN: CPT

## 2025-08-22 RX ORDER — ALBUTEROL SULFATE 90 UG/1
2 INHALANT RESPIRATORY (INHALATION) EVERY 4 HOURS PRN
Qty: 1 EACH | Refills: 0 | Status: SHIPPED | OUTPATIENT
Start: 2025-08-22 | End: 2025-09-21

## 2025-08-25 ENCOUNTER — TELEPHONE (OUTPATIENT)
Dept: FAMILY MEDICINE CLINIC | Facility: CLINIC | Age: 40
End: 2025-08-25

## 2025-08-25 DIAGNOSIS — Z00.00 ANNUAL PHYSICAL EXAM: Primary | ICD-10-CM

## (undated) NOTE — LETTER
10/19/21      Destini Villalta  [facility]      Dear Zeyad Haq,    [address] [facility] records indicate that you have outstanding lab work and or testing that was ordered for you and has not yet been completed:  Orders Placed This Encounter

## (undated) NOTE — LETTER
01/19/22        [facility]      Dear Denise Keith,    [address] [facility] records indicate that you have outstanding lab work and or testing that was ordered for you and has not yet been completed:  Orders Placed This Encounter

## (undated) NOTE — MR AVS SNAPSHOT
[facility]  [address]  [phone]               Thank you for choosing us for your health care visit with Liu Whyte MD.  We are glad to serve you and happy to provide you with this summary of [address], [facility] [address]    Hours:  24-hours Phone:  [phone]    - Norgestimate-Eth Estradiol 0.25-35 MG-MCG Tabs            Today's Orders     ThinPrep PAP with HPV Reflex Request    Complete by:  Jan 06, 2017 EAT THESE FOODS MORE OFTEN: EAT THESE FOODS LESS OFTEN:   Make half your plate fruits and vegetables Highly refined, white starches including white bread, rice and pasta   Eat plenty of protein, keep the fat content low Sugars:  sodas and sports drinks, ca